# Patient Record
Sex: FEMALE | URBAN - METROPOLITAN AREA
[De-identification: names, ages, dates, MRNs, and addresses within clinical notes are randomized per-mention and may not be internally consistent; named-entity substitution may affect disease eponyms.]

---

## 2017-04-17 ENCOUNTER — IMPORTED ENCOUNTER (OUTPATIENT)
Dept: URBAN - METROPOLITAN AREA CLINIC 38 | Facility: CLINIC | Age: 22
End: 2017-04-17

## 2017-04-17 PROBLEM — H53.2 DOUBLE VISION: Noted: 2017-04-17

## 2017-04-17 PROBLEM — H52.223 REGULAR ASTIGMATISM, BILATERAL: Noted: 2017-04-17

## 2017-04-17 PROCEDURE — 92014 COMPRE OPH EXAM EST PT 1/>: CPT

## 2022-06-09 ENCOUNTER — APPOINTMENT (EMERGENCY)
Dept: RADIOLOGY | Facility: HOSPITAL | Age: 27
End: 2022-06-09
Payer: COMMERCIAL

## 2022-06-09 ENCOUNTER — HOSPITAL ENCOUNTER (OUTPATIENT)
Facility: CLINIC | Age: 27
Discharge: STILL ADMITTED OR ADMINISTRATIVE | End: 2022-06-09
Payer: COMMERCIAL

## 2022-06-09 ENCOUNTER — HOSPITAL ENCOUNTER (EMERGENCY)
Facility: HOSPITAL | Age: 27
Discharge: HOME | End: 2022-06-09
Attending: EMERGENCY MEDICINE
Payer: COMMERCIAL

## 2022-06-09 VITALS
TEMPERATURE: 96.4 F | OXYGEN SATURATION: 98 % | WEIGHT: 293 LBS | DIASTOLIC BLOOD PRESSURE: 78 MMHG | SYSTOLIC BLOOD PRESSURE: 157 MMHG | HEIGHT: 62 IN | HEART RATE: 79 BPM | RESPIRATION RATE: 16 BRPM | BODY MASS INDEX: 53.92 KG/M2

## 2022-06-09 DIAGNOSIS — R10.32 LEFT LOWER QUADRANT ABDOMINAL PAIN: Primary | ICD-10-CM

## 2022-06-09 DIAGNOSIS — K63.89 EPIPLOIC APPENDAGITIS: ICD-10-CM

## 2022-06-09 LAB
ALBUMIN SERPL-MCNC: 4.5 G/DL (ref 3.4–5)
ALP SERPL-CCNC: 51 IU/L (ref 35–126)
ALT SERPL-CCNC: 20 IU/L (ref 11–54)
ANION GAP SERPL CALC-SCNC: 8 MEQ/L (ref 3–15)
AST SERPL-CCNC: 22 IU/L (ref 15–41)
BACTERIA URNS QL MICRO: ABNORMAL /HPF
BASOPHILS # BLD: 0.03 K/UL (ref 0.01–0.1)
BASOPHILS NFR BLD: 0.2 %
BILIRUB SERPL-MCNC: 0.6 MG/DL (ref 0.3–1.2)
BILIRUB UR QL STRIP.AUTO: NEGATIVE MG/DL
BUN SERPL-MCNC: 11 MG/DL (ref 8–20)
CALCIUM SERPL-MCNC: 9.3 MG/DL (ref 8.9–10.3)
CHLORIDE SERPL-SCNC: 102 MEQ/L (ref 98–109)
CLARITY UR REFRACT.AUTO: CLEAR
CO2 SERPL-SCNC: 26 MEQ/L (ref 22–32)
COLOR UR AUTO: YELLOW
CREAT SERPL-MCNC: 0.8 MG/DL (ref 0.6–1.1)
DIFFERENTIAL METHOD BLD: ABNORMAL
EOSINOPHIL # BLD: 0.21 K/UL (ref 0.04–0.36)
EOSINOPHIL NFR BLD: 1.5 %
ERYTHROCYTE [DISTWIDTH] IN BLOOD BY AUTOMATED COUNT: 13.2 % (ref 11.7–14.4)
GFR SERPL CREATININE-BSD FRML MDRD: >60 ML/MIN/1.73M*2
GLUCOSE SERPL-MCNC: 90 MG/DL (ref 70–99)
GLUCOSE UR STRIP.AUTO-MCNC: NEGATIVE MG/DL
HCG UR QL: NEGATIVE
HCT VFR BLDCO AUTO: 42.3 % (ref 35–45)
HGB BLD-MCNC: 13.8 G/DL (ref 11.8–15.7)
HGB UR QL STRIP.AUTO: NEGATIVE
HYALINE CASTS #/AREA URNS LPF: ABNORMAL /LPF
IMM GRANULOCYTES # BLD AUTO: 0.04 K/UL (ref 0–0.08)
IMM GRANULOCYTES NFR BLD AUTO: 0.3 %
KETONES UR STRIP.AUTO-MCNC: ABNORMAL MG/DL
LEUKOCYTE ESTERASE UR QL STRIP.AUTO: ABNORMAL
LIPASE SERPL-CCNC: 35 U/L (ref 20–51)
LYMPHOCYTES # BLD: 3.99 K/UL (ref 1.2–3.5)
LYMPHOCYTES NFR BLD: 28.4 %
MCH RBC QN AUTO: 29.5 PG (ref 28–33.2)
MCHC RBC AUTO-ENTMCNC: 32.6 G/DL (ref 32.2–35.5)
MCV RBC AUTO: 90.4 FL (ref 83–98)
MONOCYTES # BLD: 0.69 K/UL (ref 0.28–0.8)
MONOCYTES NFR BLD: 4.9 %
MUCOUS THREADS URNS QL MICRO: 2 /LPF
NEUTROPHILS # BLD: 9.07 K/UL (ref 1.7–7)
NEUTS SEG NFR BLD: 64.7 %
NITRITE UR QL STRIP.AUTO: NEGATIVE
NRBC BLD-RTO: 0 %
PDW BLD AUTO: 9.4 FL (ref 9.4–12.3)
PH UR STRIP.AUTO: 5.5 [PH]
PLATELET # BLD AUTO: 488 K/UL (ref 150–369)
POTASSIUM SERPL-SCNC: 4 MEQ/L (ref 3.6–5.1)
PROT SERPL-MCNC: 8.8 G/DL (ref 6–8.2)
PROT UR QL STRIP.AUTO: ABNORMAL
RBC # BLD AUTO: 4.68 M/UL (ref 3.93–5.22)
RBC #/AREA URNS HPF: ABNORMAL /HPF
SODIUM SERPL-SCNC: 136 MEQ/L (ref 136–144)
SP GR UR REFRACT.AUTO: 1.03
SQUAMOUS URNS QL MICRO: ABNORMAL /HPF
UROBILINOGEN UR STRIP-ACNC: 0.2 EU/DL
WBC # BLD AUTO: 14.03 K/UL (ref 3.8–10.5)
WBC #/AREA URNS HPF: ABNORMAL /HPF

## 2022-06-09 PROCEDURE — 80053 COMPREHEN METABOLIC PANEL: CPT | Performed by: EMERGENCY MEDICINE

## 2022-06-09 PROCEDURE — 83690 ASSAY OF LIPASE: CPT | Performed by: EMERGENCY MEDICINE

## 2022-06-09 PROCEDURE — 85025 COMPLETE CBC W/AUTO DIFF WBC: CPT

## 2022-06-09 PROCEDURE — 36415 COLL VENOUS BLD VENIPUNCTURE: CPT

## 2022-06-09 PROCEDURE — 74176 CT ABD & PELVIS W/O CONTRAST: CPT | Mod: ME

## 2022-06-09 PROCEDURE — 99284 EMERGENCY DEPT VISIT MOD MDM: CPT | Mod: 25

## 2022-06-09 PROCEDURE — 80053 COMPREHEN METABOLIC PANEL: CPT

## 2022-06-09 PROCEDURE — 85025 COMPLETE CBC W/AUTO DIFF WBC: CPT | Performed by: EMERGENCY MEDICINE

## 2022-06-09 PROCEDURE — 83690 ASSAY OF LIPASE: CPT

## 2022-06-09 PROCEDURE — 81001 URINALYSIS AUTO W/SCOPE: CPT | Performed by: EMERGENCY MEDICINE

## 2022-06-09 PROCEDURE — 84703 CHORIONIC GONADOTROPIN ASSAY: CPT | Performed by: PHYSICIAN ASSISTANT

## 2022-06-09 RX ORDER — TRAMADOL HYDROCHLORIDE 50 MG/1
50 TABLET ORAL EVERY 6 HOURS PRN
Qty: 20 TABLET | Refills: 0 | Status: SHIPPED | OUTPATIENT
Start: 2022-06-09 | End: 2022-06-14

## 2022-06-09 RX ORDER — DEXTROAMPHETAMINE SACCHARATE, AMPHETAMINE ASPARTATE MONOHYDRATE, DEXTROAMPHETAMINE SULFATE AND AMPHETAMINE SULFATE 5; 5; 5; 5 MG/1; MG/1; MG/1; MG/1
20 CAPSULE, EXTENDED RELEASE ORAL EVERY MORNING
COMMUNITY

## 2022-06-09 RX ORDER — ESCITALOPRAM OXALATE 20 MG/1
20 TABLET ORAL DAILY
COMMUNITY

## 2022-06-09 ASSESSMENT — ENCOUNTER SYMPTOMS
DYSURIA: 0
LIGHT-HEADEDNESS: 0
CHILLS: 0
FATIGUE: 0
NECK STIFFNESS: 0
COUGH: 0
EYES NEGATIVE: 1
DIZZINESS: 0
BACK PAIN: 0
CONSTITUTIONAL NEGATIVE: 1
SHORTNESS OF BREATH: 0
HEADACHES: 0
CONSTIPATION: 0
NAUSEA: 1
FLANK PAIN: 1
PALPITATIONS: 0
WEAKNESS: 0
CARDIOVASCULAR NEGATIVE: 1
FACIAL SWELLING: 0
DIARRHEA: 1
NECK PAIN: 0
CHEST TIGHTNESS: 0
HEMATURIA: 0
BLOOD IN STOOL: 0
NEUROLOGICAL NEGATIVE: 1
PSYCHIATRIC NEGATIVE: 1
FEVER: 0
ABDOMINAL PAIN: 1
RECTAL PAIN: 0
WOUND: 0
VOMITING: 0
TROUBLE SWALLOWING: 0
RESPIRATORY NEGATIVE: 1

## 2022-06-10 NOTE — ED PROVIDER NOTES
HPI    Chief Complaint   Patient presents with   • Abdominal Pain        HPI   26-year-old female with past medical history significant for depression and ADHD presents for evaluation of left-sided flank/abdominal pain that she states first began 4 days ago.  She states it is an aching and throbbing pain which is worse with certain movements as well as worse when she takes a deep breath.  She states it seems to be starting on the outside of the left lower abdomen near the flank but does radiate towards the center of the abdomen and the right side although she denies any pain on the right side itself.  She states that she has felt nauseated today but has had no vomiting.  She does state that she had an episode of diarrhea yesterday.  Denying any dark or black stools, rectal pain or bleeding.  She denies any fever or chills.  She denies any dysuria, hematuria, frequency or urgency.  She is denying any injury, trauma or fall.  She denies any chest pain, chest pressure, shortness of breath, cough, palpitations, tachycardia, headache, dizziness or syncope.  She states her last menstrual cycle ended 5 days ago and was normal, denying any pelvic pain, abnormal vaginal bleeding, discharge, foul odor, itching, swelling or irritation.  Denies any history of abdominal surgery.  She is a current everyday tobacco user.  Denying any history of kidney stones.  Denying any recent dietary changes.  Denying any recent travel or any known sick contacts.    Past Medical History:   Diagnosis Date   • ADHD (attention deficit hyperactivity disorder)    • Depression          History reviewed. No pertinent surgical history.    History reviewed. No pertinent family history.    Social History     Tobacco Use   • Smoking status: Current Every Day Smoker   • Smokeless tobacco: Never Used   Vaping Use   • Vaping Use: Never used   Substance Use Topics   • Alcohol use: Yes     Comment: socially   • Drug use: Never       Systems Reviewed from  "Nursing Triage:                 Review of Systems   Constitutional: Negative.  Negative for chills, fatigue and fever.   HENT: Negative.  Negative for facial swelling and trouble swallowing.    Eyes: Negative.  Negative for visual disturbance.   Respiratory: Negative.  Negative for cough, chest tightness and shortness of breath.    Cardiovascular: Negative.  Negative for chest pain and palpitations.   Gastrointestinal: Positive for abdominal pain, diarrhea and nausea. Negative for blood in stool, constipation, rectal pain and vomiting.   Genitourinary: Positive for flank pain. Negative for dysuria, hematuria, urgency, vaginal bleeding and vaginal discharge.   Musculoskeletal: Negative for back pain, neck pain and neck stiffness.   Skin: Negative.  Negative for rash and wound.   Neurological: Negative.  Negative for dizziness, syncope, weakness, light-headedness and headaches.   Psychiatric/Behavioral: Negative.  Negative for suicidal ideas.            ED Triage Vitals   Temp Heart Rate Resp BP SpO2   06/09/22 2006 06/09/22 2005 06/09/22 2005 06/09/22 2006 06/09/22 2005   (!) 35.8 °C (96.4 °F) 89 18 (!) 155/84 99 %      Temp Source Heart Rate Source Patient Position BP Location FiO2 (%) (Set)   06/09/22 2006 -- -- -- --   Temporal           Vitals:    06/09/22 2005 06/09/22 2006   BP:  (!) 155/84   Pulse: 89    Resp: 18    Temp:  (!) 35.8 °C (96.4 °F)   TempSrc:  Temporal   SpO2: 99%    Weight: 134 kg (295 lb)    Height: 1.575 m (5' 2\")                          Physical Exam  Constitutional:       General: She is not in acute distress.     Appearance: Normal appearance. She is obese. She is not toxic-appearing.   HENT:      Head: Normocephalic.      Mouth/Throat:      Mouth: Mucous membranes are moist.      Pharynx: Oropharynx is clear.   Eyes:      Conjunctiva/sclera: Conjunctivae normal.      Pupils: Pupils are equal, round, and reactive to light.   Cardiovascular:      Rate and Rhythm: Normal rate.      Pulses: " Normal pulses.   Pulmonary:      Effort: Pulmonary effort is normal. No respiratory distress.      Breath sounds: Normal breath sounds.   Abdominal:      Palpations: Abdomen is soft.      Tenderness: There is no right CVA tenderness, left CVA tenderness, guarding or rebound.      Comments: Diffusely tender palpation to the left flank and left lower quadrant, large rotund abdomen, nontender palpation over McBurney's point, nontender palpation to the epigastric region and upper abdomen diffusely.  Negative Ricks sign.    Genitourinary:     Comments: Deferred  Musculoskeletal:         General: No swelling or deformity.      Cervical back: Neck supple. No rigidity or tenderness.   Skin:     General: Skin is warm.      Capillary Refill: Capillary refill takes less than 2 seconds.   Neurological:      General: No focal deficit present.      Mental Status: She is alert and oriented to person, place, and time.   Psychiatric:         Behavior: Behavior normal.              CT ABDOMEN PELVIS WITHOUT IV CONTRAST   Final Result   IMPRESSION:   1. Inflammatory stranding lateral to the proximal descending colon with adjacent   ovoid fat density with stranding of the fat planes suggesting epiploic   appendagitis.   2. No evidence for obstructive uropathy.                         Labs Reviewed   CBC AND DIFF - Abnormal       Result Value    WBC 14.03 (*)     RBC 4.68      Hemoglobin 13.8      Hematocrit 42.3      MCV 90.4      MCH 29.5      MCHC 32.6      RDW 13.2      Platelets 488 (*)     MPV 9.4      Differential Type Auto      nRBC 0.0      Immature Granulocytes 0.3      Neutrophils 64.7      Lymphocytes 28.4      Monocytes 4.9      Eosinophils 1.5      Basophils 0.2      Immature Granulocytes, Absolute 0.04      Neutrophils, Absolute 9.07 (*)     Lymphocytes, Absolute 3.99 (*)     Monocytes, Absolute 0.69      Eosinophils, Absolute 0.21      Basophils, Absolute 0.03     COMPREHENSIVE METABOLIC PANEL - Abnormal    Sodium 136       Potassium 4.0      Chloride 102      CO2 26      BUN 11      Creatinine 0.8      Glucose 90      Calcium 9.3      AST (SGOT) 22      ALT (SGPT) 20      Alkaline Phosphatase 51      Total Protein 8.8 (*)     Albumin 4.5      Bilirubin, Total 0.6      eGFR >60.0      Anion Gap 8      Narrative:     Order only if pain is above umbilicus   UA REFLEX CULTURE (MACROSCOPIC) - Abnormal    Color, Urine Yellow      Clarity, Urine Clear      Specific Gravity, Urine 1.033 (*)     pH, Urine 5.5      Leukocyte Esterase Trace (*)     Nitrite, Urine Negative      Protein, Urine Trace (*)     Glucose, Urine Negative      Ketones, Urine Trace (*)     Urobilinogen, Urine 0.2      Bilirubin, Urine Negative      Blood, Urine Negative     UA MICROSCOPIC (UCU) - Abnormal    RBC, Urine 0 TO 4      WBC, Urine 0 TO 3      Squamous Epithelial Rare (*)     Hyaline Cast None Seen      Bacteria, Urine Rare (*)     MUCUSUA +2 (*)    LIPASE - Normal    Lipase 35      Narrative:     Order only if pain is above umbilicus   BHCG, SERUM, QUAL - Normal    Preg Test, Serum Negative     URINALYSIS REFLEX CULTURE (ED AND OUTPATIENT ONLY)    Narrative:     The following orders were created for panel order Urinalysis with Reflex Culture.  Procedure                               Abnormality         Status                     ---------                               -----------         ------                     UA Reflex to Culture (Ma...[[id number]]  Abnormal            Final result               UA Microscopic[[id number]]               Abnormal            Final result                 Please view results for these tests on the individual orders.   RAINBOW DRAW PANEL    Narrative:     The following orders were created for panel order [place] Draw Panel.  Procedure                               Abnormality         Status                     ---------                               -----------         ------                     RAINBOW RED[[id number]]                                       In process                 RAINBOW LT BLUE[[id number]]                                  In process                   Please view results for these tests on the individual orders.   RAINBOW DRAW PANEL    Narrative:     The following orders were created for panel order [place] Draw Panel.  Procedure                               Abnormality         Status                     ---------                               -----------         ------                     RAINBOW GOLD[[id number]]                                     In process                   Please view results for these tests on the individual orders.   BEDSIDE PREGNANCY, URINE   RAINBOW RED   RAINBOW LT BLUE   RAINBOW GOLD       CT ABDOMEN PELVIS WITHOUT IV CONTRAST   Final Result   IMPRESSION:   1. Inflammatory stranding lateral to the proximal descending colon with adjacent   ovoid fat density with stranding of the fat planes suggesting epiploic   appendagitis.   2. No evidence for obstructive uropathy.                           Procedures    Final diagnoses:   [R10.32] Left lower quadrant abdominal pain   [K63.89] Epiploic appendagitis       ED Course & MDM     Clinical Impressions as of 06/09/22 2246   Left lower quadrant abdominal pain   Epiploic appendagitis           [facility]    10:46 PM    Impression: Left flank/lower quadrant abdominal pain x4 days    Plan: IV, labs, UA, CT abdomen/pelvis, re-evaluate    Vital Signs Review: Vital signs have been reviewed. The oxygen saturation is  SpO2: 99 % which is within normal limits.    CT results consistent with epiploic appendagitis.  Discussed results with patient and have advised her to take pain medication as needed but not to drive while taking pain medication.  She was instructed to follow-up with her primary care doctor for repeat evaluation within the next 1 to 2 days.  If she develops any new or worsening abdominal pain, change in the type, severity or location of her pain, fever,  excessive vomiting or any other symptoms that concern she was instructed to return to the emergency department.  Patient is comfortable with discharge instructions and all questions were answered.    BRITTANY Acosta  6/9/2022          This document was created using dragon dictation software.  There might be some typographical errors due to this technology.     Cris Machado PA C  06/09/22 [id number]

## 2022-06-10 NOTE — DISCHARGE INSTRUCTIONS
Use ibuprofen every 6 hours as needed for pain, take prescription pain medication only as needed and do not drive while taking.

## 2022-06-10 NOTE — ED ATTESTATION NOTE
The patient was evaluated and managed by the physician assistant / nurse practitioner. Discussed complaint, exam and results with pa/np and agree with assessment and plan. I was available to see the pt at the request of the pa/np or pt request.        Brendan Barton MD  06/10/22 8531

## 2022-07-02 ASSESSMENT — VISUAL ACUITY
OD_SC: J1
OD_SC: 20/25
OS_SC: 20/20
OS_SC: J1

## 2022-07-02 ASSESSMENT — KERATOMETRY
OS_K1POWER_DIOPTERS: 43.75
OD_AXISANGLE_DEGREES: 170
OS_K2POWER_DIOPTERS: 44.25
OD_AXISANGLE2_DEGREES: 80
OS_AXISANGLE_DEGREES: 160
OD_K1POWER_DIOPTERS: 44.50
OS_AXISANGLE2_DEGREES: 70
OD_K2POWER_DIOPTERS: 45.00

## 2022-07-02 ASSESSMENT — TONOMETRY
OD_IOP_MMHG: 25
OS_IOP_MMHG: 24